# Patient Record
Sex: FEMALE | Race: WHITE | Employment: STUDENT | URBAN - METROPOLITAN AREA
[De-identification: names, ages, dates, MRNs, and addresses within clinical notes are randomized per-mention and may not be internally consistent; named-entity substitution may affect disease eponyms.]

---
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
## 2024-07-24 ENCOUNTER — HOSPITAL ENCOUNTER (EMERGENCY)
Age: 19
Discharge: HOME | End: 2024-07-24
Attending: STUDENT IN AN ORGANIZED HEALTH CARE EDUCATION/TRAINING PROGRAM
Payer: MEDICAID

## 2024-07-24 ENCOUNTER — APPOINTMENT (OUTPATIENT)
Dept: GENERAL RADIOLOGY | Age: 19
End: 2024-07-24
Payer: MEDICAID

## 2024-07-24 VITALS
OXYGEN SATURATION: 100 % | HEART RATE: 90 BPM | DIASTOLIC BLOOD PRESSURE: 70 MMHG | TEMPERATURE: 97.8 F | BODY MASS INDEX: 18.77 KG/M2 | SYSTOLIC BLOOD PRESSURE: 121 MMHG | RESPIRATION RATE: 12 BRPM | HEIGHT: 62 IN | WEIGHT: 102 LBS

## 2024-07-24 DIAGNOSIS — S63.501A SPRAIN OF RIGHT WRIST, INITIAL ENCOUNTER: Primary | ICD-10-CM

## 2024-07-24 PROCEDURE — 6370000000 HC RX 637 (ALT 250 FOR IP): Performed by: PHYSICIAN ASSISTANT

## 2024-07-24 PROCEDURE — 99283 EMERGENCY DEPT VISIT LOW MDM: CPT

## 2024-07-24 PROCEDURE — 73110 X-RAY EXAM OF WRIST: CPT

## 2024-07-24 RX ORDER — ACETAMINOPHEN 500 MG
1000 TABLET ORAL ONCE
Status: COMPLETED | OUTPATIENT
Start: 2024-07-24 | End: 2024-07-24

## 2024-07-24 RX ADMIN — ACETAMINOPHEN 1000 MG: 500 TABLET ORAL at 20:56

## 2024-07-25 NOTE — ED PROVIDER NOTES
eMERGENCY dEPARTMENT eNCOUnter   Independent Attestation     Pt Name: Dago Velasco  MRN: [id number]  Birthdate 2005  Date of evaluation: 7/24/24     Dago Velasco is a 18 y.o. female with CC: Wrist Injury (RT roller skating and fell.)        This visit was performed by both a physician and an APC. I performed all aspects of the MDM as documented.      Neal Baker DO  Attending Emergency Physician         Neal Baker DO  07/24/24 0936

## 2024-07-25 NOTE — ED PROVIDER NOTES
[facility] ED  [facility]      Pt Name: Dago Velasco  MRN: [id number]  Birthdate 2005  Date ofevaluation: 7/24/2024  Provider: Neal Cordova PA-C    CHIEF COMPLAINT       Chief Complaint   Patient presents with    Wrist Injury     RT roller skating and fell.         HISTORY OF PRESENT ILLNESS  (Location/Symptom, Timing/Onset, Context/Setting, Quality, Duration, Modifying Factors, Severity.)   Dago Velasco is a 18 y.o. female who presents to the emergency department with right wrist pain that occurred status post falling while rollerskating today.  Pain worsened movement relieved with rest.  No other complaints.  No elbow pain.      Nursing Notes were reviewed.    ALLERGIES     Patient has no known allergies.    CURRENT MEDICATIONS       Previous Medications    IBUPROFEN (IBU) 400 MG TABLET    Take 1 tablet by mouth every 6 hours as needed for Pain       PAST MEDICAL HISTORY   History reviewed. No pertinent past medical history.    SURGICAL HISTORY     History reviewed. No pertinent surgical history.      HISTORY     History reviewed. No pertinent family history.  No family status information on file.        SOCIAL HISTORY      reports that she is a non-smoker but has been exposed to tobacco smoke. She has never used smokeless tobacco. She reports that she does not use drugs.    REVIEW OFSYSTEMS    (2-9 systems for level 4, 10 or more for level 5)   Review of Systems    Except as noted above the remainder of the review of systems was reviewed and negative.     PHYSICAL EXAM    (up to 7 for level 4, 8 or more for level 5)     ED Triage Vitals [07/24/24 2030]   BP Temp Temp Source Pulse Respirations SpO2 Height Weight - Scale   121/70 97.8 °F (36.6 °C) Oral 90 12 100 % 1.575 m (5' 2\") 46.3 kg (102 lb)     Physical Exam  Constitutional:       Appearance: She is well-developed.   HENT:      Head: Normocephalic and atraumatic.   Cardiovascular:      Rate and Rhythm: Normal